# Patient Record
Sex: FEMALE | Race: WHITE | NOT HISPANIC OR LATINO | URBAN - METROPOLITAN AREA
[De-identification: names, ages, dates, MRNs, and addresses within clinical notes are randomized per-mention and may not be internally consistent; named-entity substitution may affect disease eponyms.]

---

## 2020-02-18 ENCOUNTER — INPATIENT (INPATIENT)
Facility: HOSPITAL | Age: 40
LOS: 2 days | Discharge: HOME | End: 2020-02-21
Attending: INTERNAL MEDICINE | Admitting: INTERNAL MEDICINE
Payer: MEDICAID

## 2020-02-18 VITALS
TEMPERATURE: 97 F | WEIGHT: 199.96 LBS | HEART RATE: 92 BPM | DIASTOLIC BLOOD PRESSURE: 80 MMHG | HEIGHT: 66 IN | SYSTOLIC BLOOD PRESSURE: 133 MMHG | RESPIRATION RATE: 16 BRPM

## 2020-02-18 DIAGNOSIS — F17.200 NICOTINE DEPENDENCE, UNSPECIFIED, UNCOMPLICATED: ICD-10-CM

## 2020-02-18 DIAGNOSIS — F12.20 CANNABIS DEPENDENCE, UNCOMPLICATED: ICD-10-CM

## 2020-02-18 DIAGNOSIS — F12.90 CANNABIS USE, UNSPECIFIED, UNCOMPLICATED: ICD-10-CM

## 2020-02-18 DIAGNOSIS — F10.20 ALCOHOL DEPENDENCE, UNCOMPLICATED: ICD-10-CM

## 2020-02-18 DIAGNOSIS — Z98.51 TUBAL LIGATION STATUS: Chronic | ICD-10-CM

## 2020-02-18 DIAGNOSIS — F32.9 MAJOR DEPRESSIVE DISORDER, SINGLE EPISODE, UNSPECIFIED: ICD-10-CM

## 2020-02-18 LAB
ALBUMIN SERPL ELPH-MCNC: 4.4 G/DL — SIGNIFICANT CHANGE UP (ref 3.5–5.2)
ALP SERPL-CCNC: 64 U/L — SIGNIFICANT CHANGE UP (ref 30–115)
ALT FLD-CCNC: 9 U/L — SIGNIFICANT CHANGE UP (ref 0–41)
AMMONIA BLD-MCNC: 11 UMOL/L — SIGNIFICANT CHANGE UP (ref 11–55)
AMYLASE P1 CFR SERPL: 82 U/L — SIGNIFICANT CHANGE UP (ref 25–115)
ANION GAP SERPL CALC-SCNC: 13 MMOL/L — SIGNIFICANT CHANGE UP (ref 7–14)
APPEARANCE UR: CLEAR — SIGNIFICANT CHANGE UP
APTT BLD: 31.3 SEC — SIGNIFICANT CHANGE UP (ref 27–39.2)
AST SERPL-CCNC: 12 U/L — SIGNIFICANT CHANGE UP (ref 0–41)
BASOPHILS # BLD AUTO: 0.02 K/UL — SIGNIFICANT CHANGE UP (ref 0–0.2)
BASOPHILS NFR BLD AUTO: 0.2 % — SIGNIFICANT CHANGE UP (ref 0–1)
BILIRUB SERPL-MCNC: 0.3 MG/DL — SIGNIFICANT CHANGE UP (ref 0.2–1.2)
BILIRUB UR-MCNC: NEGATIVE — SIGNIFICANT CHANGE UP
BUN SERPL-MCNC: 17 MG/DL — SIGNIFICANT CHANGE UP (ref 10–20)
CALCIUM SERPL-MCNC: 9.4 MG/DL — SIGNIFICANT CHANGE UP (ref 8.5–10.1)
CHLORIDE SERPL-SCNC: 101 MMOL/L — SIGNIFICANT CHANGE UP (ref 98–110)
CHOLEST SERPL-MCNC: 188 MG/DL — SIGNIFICANT CHANGE UP (ref 100–200)
CO2 SERPL-SCNC: 25 MMOL/L — SIGNIFICANT CHANGE UP (ref 17–32)
COLOR SPEC: YELLOW — SIGNIFICANT CHANGE UP
CREAT SERPL-MCNC: 0.7 MG/DL — SIGNIFICANT CHANGE UP (ref 0.7–1.5)
DIFF PNL FLD: NEGATIVE — SIGNIFICANT CHANGE UP
EOSINOPHIL # BLD AUTO: 0.17 K/UL — SIGNIFICANT CHANGE UP (ref 0–0.7)
EOSINOPHIL NFR BLD AUTO: 2 % — SIGNIFICANT CHANGE UP (ref 0–8)
ESTIMATED AVERAGE GLUCOSE: 94 MG/DL — SIGNIFICANT CHANGE UP (ref 68–114)
ETHANOL SERPL-MCNC: <10 MG/DL — SIGNIFICANT CHANGE UP
GGT SERPL-CCNC: 22 U/L — SIGNIFICANT CHANGE UP (ref 1–40)
GLUCOSE SERPL-MCNC: 95 MG/DL — SIGNIFICANT CHANGE UP (ref 70–99)
GLUCOSE UR QL: NEGATIVE MG/DL — SIGNIFICANT CHANGE UP
HBA1C BLD-MCNC: 4.9 % — SIGNIFICANT CHANGE UP (ref 4–5.6)
HCG UR QL: NEGATIVE — SIGNIFICANT CHANGE UP
HCT VFR BLD CALC: 41.6 % — SIGNIFICANT CHANGE UP (ref 37–47)
HDLC SERPL-MCNC: 59 MG/DL — SIGNIFICANT CHANGE UP
HGB BLD-MCNC: 13.6 G/DL — SIGNIFICANT CHANGE UP (ref 12–16)
IMM GRANULOCYTES NFR BLD AUTO: 0.5 % — HIGH (ref 0.1–0.3)
INR BLD: 0.95 RATIO — SIGNIFICANT CHANGE UP (ref 0.65–1.3)
KETONES UR-MCNC: NEGATIVE — SIGNIFICANT CHANGE UP
LEUKOCYTE ESTERASE UR-ACNC: NEGATIVE — SIGNIFICANT CHANGE UP
LIPID PNL WITH DIRECT LDL SERPL: 112 MG/DL — SIGNIFICANT CHANGE UP (ref 4–129)
LYMPHOCYTES # BLD AUTO: 2.91 K/UL — SIGNIFICANT CHANGE UP (ref 1.2–3.4)
LYMPHOCYTES # BLD AUTO: 34.6 % — SIGNIFICANT CHANGE UP (ref 20.5–51.1)
MAGNESIUM SERPL-MCNC: 1.9 MG/DL — SIGNIFICANT CHANGE UP (ref 1.8–2.4)
MCHC RBC-ENTMCNC: 28.9 PG — SIGNIFICANT CHANGE UP (ref 27–31)
MCHC RBC-ENTMCNC: 32.7 G/DL — SIGNIFICANT CHANGE UP (ref 32–37)
MCV RBC AUTO: 88.5 FL — SIGNIFICANT CHANGE UP (ref 81–99)
MONOCYTES # BLD AUTO: 0.59 K/UL — SIGNIFICANT CHANGE UP (ref 0.1–0.6)
MONOCYTES NFR BLD AUTO: 7 % — SIGNIFICANT CHANGE UP (ref 1.7–9.3)
NEUTROPHILS # BLD AUTO: 4.67 K/UL — SIGNIFICANT CHANGE UP (ref 1.4–6.5)
NEUTROPHILS NFR BLD AUTO: 55.7 % — SIGNIFICANT CHANGE UP (ref 42.2–75.2)
NITRITE UR-MCNC: NEGATIVE — SIGNIFICANT CHANGE UP
NRBC # BLD: 0 /100 WBCS — SIGNIFICANT CHANGE UP (ref 0–0)
PH UR: 6.5 — SIGNIFICANT CHANGE UP (ref 5–8)
PLATELET # BLD AUTO: 276 K/UL — SIGNIFICANT CHANGE UP (ref 130–400)
POTASSIUM SERPL-MCNC: 4.2 MMOL/L — SIGNIFICANT CHANGE UP (ref 3.5–5)
POTASSIUM SERPL-SCNC: 4.2 MMOL/L — SIGNIFICANT CHANGE UP (ref 3.5–5)
PROT SERPL-MCNC: 7.5 G/DL — SIGNIFICANT CHANGE UP (ref 6–8)
PROT UR-MCNC: NEGATIVE MG/DL — SIGNIFICANT CHANGE UP
PROTHROM AB SERPL-ACNC: 10.9 SEC — SIGNIFICANT CHANGE UP (ref 9.95–12.87)
RBC # BLD: 4.7 M/UL — SIGNIFICANT CHANGE UP (ref 4.2–5.4)
RBC # FLD: 13.2 % — SIGNIFICANT CHANGE UP (ref 11.5–14.5)
SODIUM SERPL-SCNC: 139 MMOL/L — SIGNIFICANT CHANGE UP (ref 135–146)
SP GR SPEC: >=1.03 (ref 1.01–1.03)
TOTAL CHOLESTEROL/HDL RATIO MEASUREMENT: 3.2 RATIO — LOW (ref 4–5.5)
TRIGL SERPL-MCNC: 121 MG/DL — SIGNIFICANT CHANGE UP (ref 10–149)
UROBILINOGEN FLD QL: 0.2 MG/DL — SIGNIFICANT CHANGE UP (ref 0.2–0.2)
WBC # BLD: 8.4 K/UL — SIGNIFICANT CHANGE UP (ref 4.8–10.8)
WBC # FLD AUTO: 8.4 K/UL — SIGNIFICANT CHANGE UP (ref 4.8–10.8)

## 2020-02-18 RX ORDER — THIAMINE MONONITRATE (VIT B1) 100 MG
100 TABLET ORAL DAILY
Refills: 0 | Status: COMPLETED | OUTPATIENT
Start: 2020-02-18 | End: 2020-02-20

## 2020-02-18 RX ORDER — MAGNESIUM HYDROXIDE 400 MG/1
30 TABLET, CHEWABLE ORAL ONCE
Refills: 0 | Status: DISCONTINUED | OUTPATIENT
Start: 2020-02-18 | End: 2020-02-21

## 2020-02-18 RX ORDER — NICOTINE POLACRILEX 2 MG
1 GUM BUCCAL DAILY
Refills: 0 | Status: DISCONTINUED | OUTPATIENT
Start: 2020-02-18 | End: 2020-02-21

## 2020-02-18 RX ORDER — METHOCARBAMOL 500 MG/1
500 TABLET, FILM COATED ORAL EVERY 6 HOURS
Refills: 0 | Status: DISCONTINUED | OUTPATIENT
Start: 2020-02-18 | End: 2020-02-21

## 2020-02-18 RX ORDER — HYDROXYZINE HCL 10 MG
100 TABLET ORAL AT BEDTIME
Refills: 0 | Status: DISCONTINUED | OUTPATIENT
Start: 2020-02-18 | End: 2020-02-21

## 2020-02-18 RX ORDER — PSEUDOEPHEDRINE HCL 30 MG
60 TABLET ORAL EVERY 6 HOURS
Refills: 0 | Status: DISCONTINUED | OUTPATIENT
Start: 2020-02-18 | End: 2020-02-21

## 2020-02-18 RX ORDER — ONDANSETRON 8 MG/1
4 TABLET, FILM COATED ORAL EVERY 6 HOURS
Refills: 0 | Status: DISCONTINUED | OUTPATIENT
Start: 2020-02-18 | End: 2020-02-21

## 2020-02-18 RX ORDER — FOLIC ACID 0.8 MG
1 TABLET ORAL DAILY
Refills: 0 | Status: DISCONTINUED | OUTPATIENT
Start: 2020-02-18 | End: 2020-02-21

## 2020-02-18 RX ORDER — HYDROXYZINE HCL 10 MG
50 TABLET ORAL EVERY 6 HOURS
Refills: 0 | Status: DISCONTINUED | OUTPATIENT
Start: 2020-02-18 | End: 2020-02-21

## 2020-02-18 RX ORDER — GUAIFENESIN/DEXTROMETHORPHAN 600MG-30MG
5 TABLET, EXTENDED RELEASE 12 HR ORAL EVERY 4 HOURS
Refills: 0 | Status: DISCONTINUED | OUTPATIENT
Start: 2020-02-18 | End: 2020-02-21

## 2020-02-18 RX ORDER — MULTIVIT-MIN/FERROUS GLUCONATE 9 MG/15 ML
1 LIQUID (ML) ORAL DAILY
Refills: 0 | Status: DISCONTINUED | OUTPATIENT
Start: 2020-02-18 | End: 2020-02-21

## 2020-02-18 RX ORDER — ACETAMINOPHEN 500 MG
650 TABLET ORAL EVERY 4 HOURS
Refills: 0 | Status: DISCONTINUED | OUTPATIENT
Start: 2020-02-18 | End: 2020-02-21

## 2020-02-18 RX ORDER — IBUPROFEN 200 MG
400 TABLET ORAL EVERY 6 HOURS
Refills: 0 | Status: DISCONTINUED | OUTPATIENT
Start: 2020-02-18 | End: 2020-02-21

## 2020-02-18 RX ADMIN — Medication 25 MILLIGRAM(S): at 19:51

## 2020-02-18 RX ADMIN — Medication 100 MILLIGRAM(S): at 17:56

## 2020-02-18 RX ADMIN — Medication 1 TABLET(S): at 17:56

## 2020-02-18 RX ADMIN — Medication 1 MILLIGRAM(S): at 17:55

## 2020-02-18 NOTE — H&P ADULT - PROBLEM SELECTOR PLAN 2
supportive care with prn meds  Check Urine Toxicology  Learning coping skill and encouraging long term sobriety  Rehab encouraged

## 2020-02-18 NOTE — H&P ADULT - HISTORY OF PRESENT ILLNESS
40 y/o female with h/o continuous alcohol dependency and cannabis use present to [facility] for detox. No prior h/o detox or rehab. Patient requesting to go to [facility] after detox.  DRUG	AGE OF ONSET	ROUTE	FREQ	AMOUNT	LAST USE	LENGTH OF CURRENT USE	  Alcohol	13 y/o	PO	Daily 	2 bottles of Isha 	20 2 shots of Isha	1 month	  Cannabis 	20 y/o	Smoking	Once a month	$ 10	2 weeks ago		  Patient denies other drug use							  							  							  							    I-Stop: N/A    Alcohol W/D Hx:  (+) Tremors   (+) Blackout      (-) Seizure      (-) DT’s       (-) Hallucination   Patient denies any Chest Pain, SOB, Abdominal Pain, HA, Blurry Vision, Bleeding, or Dysuria     Hx x of Withdrawal Seizures: NO                          MMTP :  NO      OBGYN Hx:   LMP:  3 weeks ago                                             P: 4                last Pap - Smear  : 2017 WNL                       Last Mammogram: never had one            PSYCH. Hx :  self- report of depression                          Denies any S/H Ideation or A/V Hallucination      Screening history	Last tested	Result	History of treatment	  HIV	2019	NEG	N/A	  Hepatitis C	2019	NEG	N/A	  Quantiferon GOLD TB test	2019	NEG	N/A	    Immunization	Not Received	Unknown	Received	Date Received 	  Influenza	X				  Pneumococcus	X				  Tetanus			X	<10 years	  Others

## 2020-02-18 NOTE — H&P ADULT - ASSESSMENT
40 y/o female with h/o continuous alcohol dependency and cannabis use present [facility]. No prior h/o detox or rehab. Patient requesting to go to [facility] after detox.

## 2020-02-18 NOTE — H&P ADULT - NSHPPHYSICALEXAM_GEN_ALL_CORE
-  Vital Signs:      Temp: 98.4      Pulse: 93        RR: 14       BP: 132/80    Physical Exam:              Constitutional: +Anxious A&Ox3, W/N and W/D.  HEENT: NC/AT, PERRLA, EOM Intact, Nares normal, No Sinus tenderness.  Lips, mucosa and tongue normal; Neck supple, No adenopathy  Respiratory: CTAB, no rales, no rhonchi, no wheezes  Cardiovascular: +S1S2, No M/R/G  Gastrointestinal: +BS, soft, non-tender, not distended, No CVAT  Extremities: Atraumatic, no cyanosis, no edema, no calf tenderness,  Vascular: +dorsal pedis and radial pulses, no extremity cyanosis  Neurological: sensation intact, ROM equal B/L, CN II-XII intact, Gait: steady  Skin: no rashes, no lesions, normal turgor, No track marks  No Decubiti present  No IV lines present  Rectal/Breasts Exam: Deferred

## 2020-02-18 NOTE — H&P ADULT - PROBLEM SELECTOR PLAN 1
Check Urine Toxicology  [facility] librium Protocol  Thiamine 100mg PO daily  Folic Acid 1mg PO daily  MVI 1 tablet PO daily  Monitor VS and withdrawal symptoms  PRN Medications

## 2020-02-19 LAB
AMPHET UR-MCNC: NEGATIVE — SIGNIFICANT CHANGE UP
BARBITURATES UR SCN-MCNC: NEGATIVE — SIGNIFICANT CHANGE UP
BENZODIAZ UR-MCNC: NEGATIVE — SIGNIFICANT CHANGE UP
COCAINE METAB.OTHER UR-MCNC: NEGATIVE — SIGNIFICANT CHANGE UP
DRUG SCREEN 1, URINE RESULT: SIGNIFICANT CHANGE UP
HAV IGM SER-ACNC: SIGNIFICANT CHANGE UP
HBV CORE IGM SER-ACNC: SIGNIFICANT CHANGE UP
HBV SURFACE AG SER-ACNC: SIGNIFICANT CHANGE UP
HCV AB S/CO SERPL IA: 0.27 S/CO — SIGNIFICANT CHANGE UP (ref 0–0.99)
HCV AB SERPL-IMP: SIGNIFICANT CHANGE UP
HIV 1+2 AB+HIV1 P24 AG SERPL QL IA: SIGNIFICANT CHANGE UP
METHADONE UR-MCNC: NEGATIVE — SIGNIFICANT CHANGE UP
OPIATES UR-MCNC: NEGATIVE — SIGNIFICANT CHANGE UP
PCP UR-MCNC: NEGATIVE — SIGNIFICANT CHANGE UP
PROPOXYPHENE QUALITATIVE URINE RESULT: NEGATIVE — SIGNIFICANT CHANGE UP
T PALLIDUM AB TITR SER: NEGATIVE — SIGNIFICANT CHANGE UP
THC UR QL: NEGATIVE — SIGNIFICANT CHANGE UP

## 2020-02-19 PROCEDURE — 99221 1ST HOSP IP/OBS SF/LOW 40: CPT

## 2020-02-19 RX ADMIN — Medication 1 TABLET(S): at 09:10

## 2020-02-19 RX ADMIN — Medication 100 MILLIGRAM(S): at 09:10

## 2020-02-19 RX ADMIN — Medication 1 PATCH: at 18:28

## 2020-02-19 RX ADMIN — Medication 1 PATCH: at 09:09

## 2020-02-19 RX ADMIN — Medication 1 MILLIGRAM(S): at 09:10

## 2020-02-19 RX ADMIN — Medication 50 MILLIGRAM(S): at 09:10

## 2020-02-19 NOTE — CONSULT NOTE ADULT - SUBJECTIVE AND OBJECTIVE BOX
38 y/o female with h/o continuous alcohol dependency and cannabis use present to [facility] for detox. No prior h/o detox or rehab. Patient requesting to go to [facility] after detox.    Patient admits to periods of depression mainly when she drinks for the past few weeks. States she sleeps alot, decreased appetite. Admits to psychosocial/relationship issues and feels she has let her family down. States she is now looking towards the future and is planning on going to Rehab after completing detox program. Patient states she would like to attend outpatient psych services after rehab. States no prior psych hx, no prior psychiatric hospitalizations. Denies anxiety. Denies hearing voices. Denies suicidal/homicidal ideations. Denies any s/s of ana.    Patient alert and oriented x 3, cooperative. Speech-rate and volume normal. Mood depressed. Thought Logical and Linear. No psychosis noted. No evidence of delusional thinking. No s/s of ana noted. No threats of harm to self or others at this time. Insight/Judgement fair.    Case discussed with MD Hook

## 2020-02-19 NOTE — CONSULT NOTE ADULT - ASSESSMENT
Substance induced mood disorder  R/O Major Depressive Disorder    Plan: Continue/complete detox program  [facility] after detox  Outpatient psych services after Rehab

## 2020-02-20 LAB
GAMMA INTERFERON BACKGROUND BLD IA-ACNC: 0.01 IU/ML — SIGNIFICANT CHANGE UP
M TB IFN-G BLD-IMP: NEGATIVE — SIGNIFICANT CHANGE UP
M TB IFN-G CD4+ BCKGRND COR BLD-ACNC: 0 IU/ML — SIGNIFICANT CHANGE UP
M TB IFN-G CD4+CD8+ BCKGRND COR BLD-ACNC: 0 IU/ML — SIGNIFICANT CHANGE UP
QUANT TB PLUS MITOGEN MINUS NIL: 8.98 IU/ML — SIGNIFICANT CHANGE UP

## 2020-02-20 RX ORDER — GABAPENTIN 400 MG/1
1 CAPSULE ORAL
Qty: 45 | Refills: 0
Start: 2020-02-20

## 2020-02-20 RX ORDER — GABAPENTIN 400 MG/1
300 CAPSULE ORAL THREE TIMES A DAY
Refills: 0 | Status: DISCONTINUED | OUTPATIENT
Start: 2020-02-20 | End: 2020-02-21

## 2020-02-20 RX ADMIN — Medication 1 PATCH: at 20:35

## 2020-02-20 RX ADMIN — Medication 100 MILLIGRAM(S): at 09:12

## 2020-02-20 RX ADMIN — Medication 100 MILLIGRAM(S): at 20:34

## 2020-02-20 RX ADMIN — GABAPENTIN 300 MILLIGRAM(S): 400 CAPSULE ORAL at 09:12

## 2020-02-20 RX ADMIN — Medication 1 MILLIGRAM(S): at 09:12

## 2020-02-20 RX ADMIN — Medication 1 TABLET(S): at 09:12

## 2020-02-20 RX ADMIN — GABAPENTIN 300 MILLIGRAM(S): 400 CAPSULE ORAL at 20:33

## 2020-02-20 RX ADMIN — GABAPENTIN 300 MILLIGRAM(S): 400 CAPSULE ORAL at 14:10

## 2020-02-20 RX ADMIN — Medication 1 PATCH: at 09:12

## 2020-02-20 RX ADMIN — Medication 1 PATCH: at 09:14

## 2020-02-20 NOTE — CHART NOTE - NSCHARTNOTEFT_GEN_A_CORE
Subsequent Inpatient Encounter                                       Detox Unit    LILLIE LLOYD   39y   Female      Chief Complaint:    Follow up for Alcohol  Dependency    HPI:     I reviewed previous notes. No Change, except if noted below.             Detail:_    ROS:   I reviewed with patient.  No changes from previous notes except if noted below.             Detail: _    PFSH I reviewed with patient. No changes from previous notes except if noted below.             Detail_    Medication reconciliation performed.    MEDICATIONS  (STANDING):  folic acid 1 milliGRAM(s) Oral daily  multivitamin/minerals 1 Tablet(s) Oral daily  nicotine -  14 mG/24Hr(s) Patch 1 Patch Transdermal daily  thiamine 100 milliGRAM(s) Oral daily      MEDICATIONS  (PRN):  acetaminophen   Tablet .. 650 milliGRAM(s) Oral every 4 hours PRN Temp greater or equal to 38C (100.4F), Moderate Pain (4 - 6)  aluminum hydroxide/magnesium hydroxide/simethicone Suspension 30 milliLiter(s) Oral every 6 hours PRN Heartburn  bismuth subsalicylate Liquid 30 milliLiter(s) Oral every 6 hours PRN Diarrhea  chlordiazePOXIDE 25 milliGRAM(s) Oral every 2 hours PRN CIWA-Ar score  8 - 15  chlordiazePOXIDE 50 milliGRAM(s) Oral every 1 hour PRN CIWA-Ar score GREATER THAN 15  cloNIDine 0.1 milliGRAM(s) Oral every 8 hours PRN Blood Pressure GREATER THAN 140/90 mmHG  guaifenesin/dextromethorphan  Syrup 5 milliLiter(s) Oral every 4 hours PRN Cough  hydrOXYzine hydrochloride 50 milliGRAM(s) Oral every 6 hours PRN Anxiety  hydrOXYzine hydrochloride 100 milliGRAM(s) Oral at bedtime PRN insomnia  ibuprofen  Tablet. 400 milliGRAM(s) Oral every 6 hours PRN Mild Pain (1 - 3)  magnesium hydroxide Suspension 30 milliLiter(s) Oral once PRN Constipation  methocarbamol 500 milliGRAM(s) Oral every 6 hours PRN muscle pain  ondansetron   Disintegrating Tablet 4 milliGRAM(s) Oral every 6 hours PRN Nausea and/or Vomiting  pseudoephedrine 60 milliGRAM(s) Oral every 6 hours PRN Rhinitis      T(C): 35.8 (02-20-20 @ 06:00), Max: 36.6 (02-19-20 @ 22:00)  HR: 81 (02-20-20 @ 06:00) (80 - 95)  BP: 119/56 (02-20-20 @ 06:00) (102/58 - 126/66)  RR: 14 (02-20-20 @ 06:00) (14 - 16)  SpO2: --    PHYSICAL EXAM:      Constitutional: NAD, A&O x3    Eyes: PERRLA, no conjuctivitis    Neck: no lymphadenopathy    Respiratory: +air entry, no rales, no rhonchi, no wheezes    Cardiovascular: +S1 and S2, regular rate and rhythm    Gastrointestinal: +BS, soft, non-tender, not distended    Extremities:  no edema, no calf tenderness    Skin: no rashes, normal turgor                            13.6   8.40  )-----------( 276      ( 18 Feb 2020 16:08 )             41.6   02-18    139  |  101  |  17  ----------------------------<  95  4.2   |  25  |  0.7    Ca    9.4      18 Feb 2020 16:08  Mg     1.9     02-18    TPro  7.5  /  Alb  4.4  /  TBili  0.3  /  DBili  x   /  AST  12  /  ALT  9   /  AlkPhos  64  02-18  PT/INR - ( 18 Feb 2020 16:08 )   PT: 10.90 sec;   INR: 0.95 ratio         PTT - ( 18 Feb 2020 16:08 )  PTT:31.3 sec  Magnesium, Serum: 1.9 mg/dL (02-18-20 @ 16:08)  Ammonia, Serum: 11 umol/L (02-18-20 @ 16:08)  Amylase, Serum Total: 82 U/L (02-18-20 @ 16:08)  Hemoglobin A1C, Whole Blood: 4.9 % (02-18-20 @ 16:08)  Treponema Pallidum Antibody Interpretation: Negative (02-18-20 @ 16:08)  Hepatitis B Surface Antigen: Nonreact (02-18-20 @ 16:08)  Hepatitis C Virus S/CO Ratio: 0.27 S/CO (02-18-20 @ 16:08)    Hepatitis C Virus Interpretation: Nonreact (02-18-20 @ 16:08)      Urinalysis Basic - ( 18 Feb 2020 16:49 )    Color: Yellow / Appearance: Clear / SG: >=1.030 / pH: x  Gluc: x / Ketone: Negative  / Bili: Negative / Urobili: 0.2 mg/dL   Blood: x / Protein: Negative mg/dL / Nitrite: Negative   Leuk Esterase: Negative / RBC: x / WBC x   Sq Epi: x / Non Sq Epi: x / Bacteria: x    Drug Screen 1, Urine Result: Done (02-18-20 @ 16:49)        Impression and Plan:    Primary Diagnosis:  Alcohol Dependency                                Medication: Librium Protocol/ CIWA Protocol    Secondary Diagnosis:   Depression                                                               Medication: seen by psych no meds    Tertiary Diagnosis:                                                                       Medication      Continue Detox Protocols. Use of PRNS as needed for withdrawal and comfort.    Adjustments to protocols:    Labs/ Tests reviewed.    Tests ordered:     Likely Disposition: _X__Home       ___Rehab       ___Outpatient Program    ___Self Help     _____Other    Estimated Length of stay:__3__

## 2020-02-21 VITALS
RESPIRATION RATE: 16 BRPM | SYSTOLIC BLOOD PRESSURE: 128 MMHG | DIASTOLIC BLOOD PRESSURE: 81 MMHG | HEART RATE: 96 BPM | TEMPERATURE: 96 F

## 2020-02-25 DIAGNOSIS — F19.94 OTHER PSYCHOACTIVE SUBSTANCE USE, UNSPECIFIED WITH PSYCHOACTIVE SUBSTANCE-INDUCED MOOD DISORDER: ICD-10-CM

## 2020-02-25 DIAGNOSIS — F17.210 NICOTINE DEPENDENCE, CIGARETTES, UNCOMPLICATED: ICD-10-CM

## 2020-02-25 DIAGNOSIS — F12.90 CANNABIS USE, UNSPECIFIED, UNCOMPLICATED: ICD-10-CM

## 2020-02-25 DIAGNOSIS — F10.20 ALCOHOL DEPENDENCE, UNCOMPLICATED: ICD-10-CM

## 2020-02-25 DIAGNOSIS — Z98.51 TUBAL LIGATION STATUS: ICD-10-CM

## 2020-02-25 DIAGNOSIS — F32.9 MAJOR DEPRESSIVE DISORDER, SINGLE EPISODE, UNSPECIFIED: ICD-10-CM
